# Patient Record
(demographics unavailable — no encounter records)

---

## 2025-04-02 NOTE — HISTORY OF PRESENT ILLNESS
[Doing Well] : doing well [Adherent] : the patient is adherent with ~his/her~ medication regimen [Goals--Doing Well] : the patient is doing well with ~his/her~ COPD goals [PFTs] : pulmonary function tests [None] : No associated symptoms are reported [CPAP] : CPAP [Good Compliance] : good compliance with treatment [Good Tolerance] : good tolerance of treatment [Good Symptom Control] : good symptom control [Follow-Up - Routine Clinic] : a routine clinic follow-up of [Home] : at home, [unfilled] , at the time of the visit. [Medical Office: ([facility])___] : at the medical office located in  [Telehealth (audio & video)] : This visit was provided via telehealth using real-time 2-way audio visual technology. [Verbal consent obtained from patient] : the patient, [unfilled]

## 2025-04-02 NOTE — ASSESSMENT
[FreeTextEntry1] : Moderate COPD DAVID on CPAP 13 cm H2O HO small lung nodules with HO RCC, followed at [facility].

## 2025-05-22 NOTE — CONSULT LETTER
[Dear  ___] : Dear  [unfilled], [Courtesy Letter:] : I had the pleasure of seeing your patient, [unfilled], in my office today. [Please see my note below.] : Please see my note below. [Consult Closing:] : Thank you very much for allowing me to participate in the care of this patient.  If you have any questions, please do not hesitate to contact me. [FreeTextEntry3] : Respectfully,  Helio Bush M.D., FACS [DrSanaz  ___] : Dr. RAY [DrSanaz ___] : Dr. RAY

## 2025-05-22 NOTE — ASSESSMENT
[FreeTextEntry1] : Arvind is a pleasant 78-year-old  and retired gentleman with a past medical history significant for hypertension, COPD, diabetes (his last hemoglobin A1c was 5.5), coronary artery disease status post coronary artery stenting in 2015, GERD, skin cancer and kidney cancer status post laparoscopic partial nephrectomy with simultaneous umbilical hernia repair with mesh (by another surgeon) who presents to the office with intermittent pain and swelling in the periumbilical region suspicious for a recurrence.  He occasionally does moderately heavy lifting and strenuous activity around the house, and he enjoys golfing and walks daily on the treadmill.  Physical examination demonstrates a tender strawberry sized bulge at the umbilicus which is only partially reducible consistent with an incarcerated recurrent umbilical hernia warranting surgical repair.  There is no evidence of strangulation, and the patient denies any symptoms of obstruction.  This hernia is complicated by a mild to moderate rectus diastasis likely related to his excess abdominal weight.  His current BMI is 29.  I explained the pros and cons of surgery, as well as all risks, benefits, indications and alternatives of the procedure and the patient understood and agreed.  He will talk this over with work and family and call back to schedule in the near future.  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  Arvind is aware that the repair of his incarcerated umbilical hernia with mesh will be done under [facility] with IV SEDATION at the [facility] with presurgical testing preceding this date. He was encouraged to avoid heavy lifting and strenuous activity in the interim, of course.

## 2025-05-22 NOTE — PHYSICAL EXAM
[JVD] : no jugular venous distention  [Normal Breath Sounds] : Normal breath sounds [No Rash or Lesion] : No rash or lesion [Alert] : alert [Calm] : calm [de-identified] : Healthy [de-identified] : Normal [de-identified] : Mildly protuberant abdomen, mild rectus diastasis [de-identified] : Recurrent incarcerated umbilical hernia